# Patient Record
Sex: FEMALE | Employment: STUDENT | URBAN - METROPOLITAN AREA
[De-identification: names, ages, dates, MRNs, and addresses within clinical notes are randomized per-mention and may not be internally consistent; named-entity substitution may affect disease eponyms.]

---

## 2019-11-21 ENCOUNTER — OFFICE VISIT - HEALTHEAST (OUTPATIENT)
Dept: FAMILY MEDICINE | Facility: CLINIC | Age: 24
End: 2019-11-21

## 2019-11-21 DIAGNOSIS — Z00.00 ANNUAL PHYSICAL EXAM: ICD-10-CM

## 2019-11-21 DIAGNOSIS — Z79.899 CONTROLLED SUBSTANCE AGREEMENT SIGNED: ICD-10-CM

## 2019-11-21 DIAGNOSIS — Z23 IMMUNIZATION DUE: ICD-10-CM

## 2019-11-21 DIAGNOSIS — Z87.42 HISTORY OF ABNORMAL CERVICAL PAP SMEAR: ICD-10-CM

## 2019-11-21 DIAGNOSIS — F90.0 ATTENTION DEFICIT HYPERACTIVITY DISORDER (ADHD), PREDOMINANTLY INATTENTIVE TYPE: ICD-10-CM

## 2019-11-21 ASSESSMENT — MIFFLIN-ST. JEOR: SCORE: 1292.83

## 2019-11-23 ENCOUNTER — COMMUNICATION - HEALTHEAST (OUTPATIENT)
Dept: FAMILY MEDICINE | Facility: CLINIC | Age: 24
End: 2019-11-23

## 2019-11-25 ENCOUNTER — COMMUNICATION - HEALTHEAST (OUTPATIENT)
Dept: FAMILY MEDICINE | Facility: CLINIC | Age: 24
End: 2019-11-25

## 2019-11-25 DIAGNOSIS — F90.0 ATTENTION DEFICIT HYPERACTIVITY DISORDER (ADHD), PREDOMINANTLY INATTENTIVE TYPE: ICD-10-CM

## 2019-11-26 LAB
BKR LAB AP ABNORMAL BLEEDING: NO
BKR LAB AP BIRTH CONTROL/HORMONES: ABNORMAL
BKR LAB AP CERVICAL APPEARANCE: NORMAL
BKR LAB AP GYN ADEQUACY: ABNORMAL
BKR LAB AP GYN INTERPRETATION: ABNORMAL
BKR LAB AP GYN OTHER FINDINGS: ABNORMAL
BKR LAB AP HPV REFLEX: ABNORMAL
BKR LAB AP LMP: ABNORMAL
BKR LAB AP PATIENT STATUS: ABNORMAL
BKR LAB AP PREVIOUS ABNORMAL: ABNORMAL
BKR LAB AP PREVIOUS NORMAL: ABNORMAL
HIGH RISK?: NO
PATH REPORT.COMMENTS IMP SPEC: ABNORMAL
RESULT FLAG (HE HISTORICAL CONVERSION): ABNORMAL

## 2020-02-19 ENCOUNTER — COMMUNICATION - HEALTHEAST (OUTPATIENT)
Dept: FAMILY MEDICINE | Facility: CLINIC | Age: 25
End: 2020-02-19

## 2020-02-19 DIAGNOSIS — F90.0 ATTENTION DEFICIT HYPERACTIVITY DISORDER (ADHD), PREDOMINANTLY INATTENTIVE TYPE: ICD-10-CM

## 2020-02-24 ENCOUNTER — COMMUNICATION - HEALTHEAST (OUTPATIENT)
Dept: FAMILY MEDICINE | Facility: CLINIC | Age: 25
End: 2020-02-24

## 2020-02-24 DIAGNOSIS — F90.0 ATTENTION DEFICIT HYPERACTIVITY DISORDER (ADHD), PREDOMINANTLY INATTENTIVE TYPE: ICD-10-CM

## 2020-02-26 ENCOUNTER — OFFICE VISIT - HEALTHEAST (OUTPATIENT)
Dept: FAMILY MEDICINE | Facility: CLINIC | Age: 25
End: 2020-02-26

## 2020-02-26 DIAGNOSIS — F90.0 ATTENTION DEFICIT HYPERACTIVITY DISORDER (ADHD), PREDOMINANTLY INATTENTIVE TYPE: ICD-10-CM

## 2020-02-26 ASSESSMENT — MIFFLIN-ST. JEOR: SCORE: 1287.72

## 2020-03-01 ENCOUNTER — HEALTH MAINTENANCE LETTER (OUTPATIENT)
Age: 25
End: 2020-03-01

## 2020-03-24 ENCOUNTER — RECORDS - HEALTHEAST (OUTPATIENT)
Dept: ADMINISTRATIVE | Facility: OTHER | Age: 25
End: 2020-03-24

## 2020-10-08 ENCOUNTER — COMMUNICATION - HEALTHEAST (OUTPATIENT)
Dept: FAMILY MEDICINE | Facility: CLINIC | Age: 25
End: 2020-10-08

## 2020-10-13 ENCOUNTER — OFFICE VISIT - HEALTHEAST (OUTPATIENT)
Dept: FAMILY MEDICINE | Facility: CLINIC | Age: 25
End: 2020-10-13

## 2020-10-13 ENCOUNTER — COMMUNICATION - HEALTHEAST (OUTPATIENT)
Dept: FAMILY MEDICINE | Facility: CLINIC | Age: 25
End: 2020-10-13

## 2020-10-13 DIAGNOSIS — F90.0 ATTENTION DEFICIT HYPERACTIVITY DISORDER (ADHD), PREDOMINANTLY INATTENTIVE TYPE: ICD-10-CM

## 2020-10-13 DIAGNOSIS — Z01.84 ANTIBODY RESPONSE EXAMINATION: ICD-10-CM

## 2020-10-13 DIAGNOSIS — Z79.899 CONTROLLED SUBSTANCE AGREEMENT SIGNED: ICD-10-CM

## 2020-10-13 DIAGNOSIS — N76.0 ACUTE VAGINITIS: ICD-10-CM

## 2020-10-13 DIAGNOSIS — L92.1 NECROBIOSIS LIPOIDICA: ICD-10-CM

## 2020-10-13 DIAGNOSIS — Z23 IMMUNIZATION DUE: ICD-10-CM

## 2020-10-13 DIAGNOSIS — Z87.42 HISTORY OF ABNORMAL CERVICAL PAP SMEAR: ICD-10-CM

## 2020-10-13 DIAGNOSIS — N89.8 VAGINAL DISCHARGE: ICD-10-CM

## 2020-10-13 LAB
CLUE CELLS: ABNORMAL
TRICHOMONAS, WET PREP: ABNORMAL
YEAST, WET PREP: ABNORMAL

## 2020-10-13 ASSESSMENT — MIFFLIN-ST. JEOR: SCORE: 1274.12

## 2020-10-15 ENCOUNTER — COMMUNICATION - HEALTHEAST (OUTPATIENT)
Dept: FAMILY MEDICINE | Facility: CLINIC | Age: 25
End: 2020-10-15

## 2020-10-15 DIAGNOSIS — F90.0 ATTENTION DEFICIT HYPERACTIVITY DISORDER (ADHD), PREDOMINANTLY INATTENTIVE TYPE: ICD-10-CM

## 2020-10-15 LAB
BKR LAB AP ABNORMAL BLEEDING: NO
BKR LAB AP BIRTH CONTROL/HORMONES: ABNORMAL
BKR LAB AP CERVICAL APPEARANCE: ABNORMAL
BKR LAB AP GYN ADEQUACY: ABNORMAL
BKR LAB AP GYN INTERPRETATION: ABNORMAL
BKR LAB AP GYN OTHER FINDINGS: ABNORMAL
BKR LAB AP HPV REFLEX: ABNORMAL
BKR LAB AP LMP: ABNORMAL
BKR LAB AP PATIENT STATUS: ABNORMAL
BKR LAB AP PREVIOUS ABNORMAL: ABNORMAL
BKR LAB AP PREVIOUS NORMAL: ABNORMAL
HIGH RISK?: NO
PATH REPORT.COMMENTS IMP SPEC: ABNORMAL
RESULT FLAG (HE HISTORICAL CONVERSION): ABNORMAL

## 2020-10-16 ENCOUNTER — COMMUNICATION - HEALTHEAST (OUTPATIENT)
Dept: FAMILY MEDICINE | Facility: CLINIC | Age: 25
End: 2020-10-16

## 2020-10-16 DIAGNOSIS — Z87.42 HISTORY OF ABNORMAL CERVICAL PAP SMEAR: ICD-10-CM

## 2020-11-06 LAB — RABV NAB SER NT-ACNC: 1 IU/ML

## 2020-11-19 ENCOUNTER — COMMUNICATION - HEALTHEAST (OUTPATIENT)
Dept: FAMILY MEDICINE | Facility: CLINIC | Age: 25
End: 2020-11-19

## 2020-12-14 ENCOUNTER — COMMUNICATION - HEALTHEAST (OUTPATIENT)
Dept: OBGYN | Facility: CLINIC | Age: 25
End: 2020-12-14

## 2020-12-14 ENCOUNTER — HEALTH MAINTENANCE LETTER (OUTPATIENT)
Age: 25
End: 2020-12-14

## 2020-12-14 ENCOUNTER — COMMUNICATION - HEALTHEAST (OUTPATIENT)
Dept: LAB | Facility: CLINIC | Age: 25
End: 2020-12-14

## 2020-12-14 DIAGNOSIS — Z87.42 HISTORY OF ABNORMAL CERVICAL PAP SMEAR: ICD-10-CM

## 2021-03-30 ENCOUNTER — COMMUNICATION - HEALTHEAST (OUTPATIENT)
Dept: OBGYN | Facility: CLINIC | Age: 26
End: 2021-03-30

## 2021-03-30 DIAGNOSIS — Z87.42 HISTORY OF ABNORMAL CERVICAL PAP SMEAR: ICD-10-CM

## 2021-04-08 ENCOUNTER — COMMUNICATION - HEALTHEAST (OUTPATIENT)
Dept: FAMILY MEDICINE | Facility: CLINIC | Age: 26
End: 2021-04-08

## 2021-04-08 DIAGNOSIS — F90.0 ATTENTION DEFICIT HYPERACTIVITY DISORDER (ADHD), PREDOMINANTLY INATTENTIVE TYPE: ICD-10-CM

## 2021-04-12 ENCOUNTER — COMMUNICATION - HEALTHEAST (OUTPATIENT)
Dept: FAMILY MEDICINE | Facility: CLINIC | Age: 26
End: 2021-04-12

## 2021-04-12 DIAGNOSIS — B37.31 RECURRENT CANDIDIASIS OF VAGINA: ICD-10-CM

## 2021-04-17 ENCOUNTER — HEALTH MAINTENANCE LETTER (OUTPATIENT)
Age: 26
End: 2021-04-17

## 2021-04-29 ENCOUNTER — OFFICE VISIT - HEALTHEAST (OUTPATIENT)
Dept: FAMILY MEDICINE | Facility: CLINIC | Age: 26
End: 2021-04-29

## 2021-04-29 DIAGNOSIS — Z87.42 HISTORY OF ABNORMAL CERVICAL PAP SMEAR: ICD-10-CM

## 2021-04-29 DIAGNOSIS — B37.31 VAGINAL CANDIDIASIS: ICD-10-CM

## 2021-04-29 DIAGNOSIS — Z23 IMMUNIZATION DUE: ICD-10-CM

## 2021-06-03 VITALS
RESPIRATION RATE: 20 BRPM | HEART RATE: 68 BPM | WEIGHT: 125.38 LBS | BODY MASS INDEX: 22.21 KG/M2 | HEIGHT: 63 IN | TEMPERATURE: 98.4 F | SYSTOLIC BLOOD PRESSURE: 108 MMHG | DIASTOLIC BLOOD PRESSURE: 70 MMHG

## 2021-06-03 NOTE — PROGRESS NOTES
Pap smear last year LSIL. Per ASCCP guidelines, recommended repeat cytology in 12 months.    Testing again reveals LSIL.  Per ASCCP guidelines, next step is to repeat cytology again at 12 months.  If on follow-up Pap smear results are ASCUS or greater, recommend proceeding to colposcopy.  Patient updated by my chart message.  Ju Padilla, DO

## 2021-06-03 NOTE — PROGRESS NOTES
[facility] Note    Name: Grace E Kopitzke  : 1995   MRN: [id number]    Grace E Kopitzke is a 24 y.o. female presenting to discuss the following:     CC:   Chief Complaint   Patient presents with     [facility] Care     Annual Exam     Pap     HPI:  History of abnormal pap smear August of last year. Had IUD placed at that time. Was told to come in for biopsy, then told she did't need it. Normally sees yeast infection on pap, asymptomatic.     History of ADHD growing up, never was on medication but family discussed often. 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 Has a clinical psychologist at school and suggested coming into clinic.    No concerns for STI screens, no new partners since last check.   Will do flu shot today.    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    She is not exercising regularly at this time.  She has no vision concerns.  She does see the dentist regularly.    ROS:   See HPI above.     PMH:   Patient Active Problem List   Diagnosis     Attention deficit hyperactivity disorder (ADHD), predominantly inattentive type     Controlled substance agreement signed - 19     History of abnormal cervical Pap smear - LSIL      Kyleena IUD in place - remove 2021     Past Medical History:   Diagnosis Date     Abnormal Papanicolaou smear of cervix LSIL 2018       PSH:   History reviewed. No pertinent surgical history.    MEDICATIONS:   Current Outpatient Medications on File Prior to Visit   Medication Sig Dispense Refill     b complex vitamins tablet Take 1 tablet by mouth daily.       cholecalciferol, vitamin D3, 2,000 unit Tab Take 2,000 Units by mouth daily.       levonorgestrel (KYLEENA) 17.5 mcg/24 hrs (5 yrs) 19.5 mg IUD IUD 1 Intra Uterine Device by Intrauterine route.       naproxen sodium (ALEVE) 220 MG tablet Take 220 mg by mouth 2 (two) times a day as needed.       No current facility-administered medications  "on file prior to visit.      ALLERGIES:  Allergies   Allergen Reactions     Benadryl [Diphenhydramine Hcl]      Reports fatigue     Guaifenesin Other (See Comments)     \"feels like I'm high\"       FAMHx:  Family History   Problem Relation Age of Onset     No Medical Problems Mother      No Medical Problems Father      No Medical Problems Sister      No Medical Problems Brother        SOCIAL HISTORY:   Social History     Tobacco Use     Smoking status: Never Smoker     Smokeless tobacco: Never Used   Substance Use Topics     Alcohol use: Not Currently     Drug use: Never     PHYSICAL EXAM:   /70   Pulse 68   Temp 98.4  F (36.9  C) (Oral)   Resp 20   Ht 5' 3\" (1.6 m)   Wt 125 lb 6 oz (56.9 kg)   LMP 10/17/2019 (Approximate)   BMI 22.21 kg/m     GENERAL: Ida is a pleasant, well-appearing female in no acute distress.  HEENT: Sclera white, no nasal discharge, oropharynx pink and moist, no cervical lymphadenopathy, no thyromegaly.  HEART: Regular rate and rhythm, no murmurs.  LUNGS: Clear to auscultation bilaterally, unlabored.  ABDOMEN: Soft, nontender to palpation.  No palpable masses.  GYN: No external genital lesions.  Neurologic discharge.  No gross cervical lesions present.  IUD strings visible extruding through external loss.  MSK: No deformities or effusions.  NEURO: Speech intact, face symmetrical, no gross deficits.  Normal gait.  PSYCH: Mood is good, normal affect, appropriately groomed.    ASSESSMENT & PLAN:   Grace E Kopitzke is a 24 y.o. female presenting today for annual physical exam, to establish care, follow-up abnormal Pap smear, and discuss concerns for ADHD.    1. Annual physical exam  Reviewed past medical history.  Reviewed health maintenance recommendations.  He declines STI screening today, recently had this done.  She will check her home records for last tetanus booster.  If due, she will schedule nurse only appointment for tetanus booster.    2. Attention deficit hyperactivity " disorder (ADHD), predominantly inattentive type  3. Controlled substance agreement signed - 11/21/19  - dextroamphetamine-amphetamine (ADDERALL XR) 20 MG 24 hr capsule; Take 1 capsule (20 mg total) by mouth every morning.  Dispense: 30 capsule; Refill: 0    History of ADHD diagnosis child although never medically treated.  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  She did meet with school psychologist who recommended evaluation for ADHD.  Reviewed adult ADHD screening tool and she certainly fits characteristics of adult ADHD.  Discussed possibility of referral to [facility] for formal evaluation, however she is under time crunch with that urinary coursework. Given previous history, do think it is reasonable to initiate treatment for ADHD with Adderall.  Reviewed controlled substance agreement today with patient and she signed this.  We will start with Adderall 20 mg extended release daily.  She will send my chart message if dose is inadequate or if too many side effects.    Follow-up in clinic in 3 months for ADHD recheck    4. History of abnormal cervical Pap smear - LSIL 8/18  - Gynecologic Cytology (PAP Smear)    History of LSIL. By ASCCP algorithm, due for repeat pap now. Pending results, will either repeat cotesting in 1 year or proceed to colposcopy.     5. Immunization due  - Influenza,Seasonal,Quad,INJ =/>6months     HM: Declined STI screen today.     RTC: 3 months - follow up ADHD    Ju Padilla DO

## 2021-06-03 NOTE — TELEPHONE ENCOUNTER
Patient sent update on effectiveness of Adderall. Helpful, but wears off after 5 hours. Trialed two times a day dosing and more effective, still able to sleep.   Updated prescription to pharmacy.    1. Attention deficit hyperactivity disorder (ADHD), predominantly inattentive type  - dextroamphetamine-amphetamine (ADDERALL XR) 20 MG 24 hr capsule; Take 1 capsule (20 mg total) by mouth 2 times daily before breakfast and lunch.  Dispense: 60 capsule; Refill: 0     Ju Padilla DO

## 2021-06-04 VITALS
SYSTOLIC BLOOD PRESSURE: 104 MMHG | DIASTOLIC BLOOD PRESSURE: 70 MMHG | HEIGHT: 63 IN | BODY MASS INDEX: 22.02 KG/M2 | WEIGHT: 124.25 LBS | RESPIRATION RATE: 16 BRPM | HEART RATE: 68 BPM

## 2021-06-04 VITALS
HEIGHT: 63 IN | DIASTOLIC BLOOD PRESSURE: 76 MMHG | SYSTOLIC BLOOD PRESSURE: 113 MMHG | WEIGHT: 121.25 LBS | HEART RATE: 69 BPM | BODY MASS INDEX: 21.48 KG/M2

## 2021-06-05 VITALS
BODY MASS INDEX: 21.75 KG/M2 | HEART RATE: 65 BPM | TEMPERATURE: 98.1 F | WEIGHT: 122.8 LBS | SYSTOLIC BLOOD PRESSURE: 114 MMHG | RESPIRATION RATE: 16 BRPM | DIASTOLIC BLOOD PRESSURE: 75 MMHG

## 2021-06-06 NOTE — TELEPHONE ENCOUNTER
Reviewed [place] . Refill request is appropriate.  Patient due for follow up. If stable, will plan to extend ADHD checks to every 6 months.  Ju Padilla, DO

## 2021-06-06 NOTE — TELEPHONE ENCOUNTER
Controlled Substance Refill Request  Medication Name:   Requested Prescriptions     Pending Prescriptions Disp Refills     dextroamphetamine-amphetamine (ADDERALL XR) 20 MG 24 hr capsule 60 capsule 0     Sig: Take 1 capsule (20 mg total) by mouth 2 times daily before breakfast and lunch.     Date Last Fill: 12/4/19  Requested Pharmacy: [facility]  Submit electronically to pharmacy  Controlled Substance Agreement on file:   Encounter-Level CSA Scan Date:    There are no encounter-level csa scan date.        Last office visit:  11/21/19 physical

## 2021-06-06 NOTE — PROGRESS NOTES
"[facility] Note    Name: Grace E Kopitzke  : 1995   MRN: [id number]    Grace E Kopitzke is a 24 y.o. female resenting to discuss the following:     CC:   Chief Complaint   Patient presents with     Medication Education Visit       HPI:  Ida presents today for follow up ADHD medications since restarting last fall. Medications have helped a lot. She is better able to focus during exams. She uses twice daily some days, most days only needs 1 dose. Denies any side effects. Appetite is good. Sleeping okay.     ROS:   See HPI above.     PMH:   Patient Active Problem List   Diagnosis     Attention deficit hyperactivity disorder (ADHD), predominantly inattentive type     Controlled substance agreement signed - 19     History of abnormal cervical Pap smear - LSIL  and      Kyleena IUD in place - remove 2021       Past Medical History:   Diagnosis Date     Abnormal Papanicolaou smear of cervix LSIL 2018       PSH:   No past surgical history on file.      MEDICATIONS:   Current Outpatient Medications on File Prior to Visit   Medication Sig Dispense Refill     b complex vitamins tablet Take 1 tablet by mouth daily.       cholecalciferol, vitamin D3, 2,000 unit Tab Take 2,000 Units by mouth daily.       dextroamphetamine-amphetamine (ADDERALL XR) 20 MG 24 hr capsule Take 1 capsule (20 mg total) by mouth 2 times daily before breakfast and lunch. 60 capsule 0     fish oil-omega-3 fatty acids 300-1,000 mg capsule        levonorgestrel (KYLEENA) 17.5 mcg/24 hrs (5 yrs) 19.5 mg IUD IUD 1 Intra Uterine Device by Intrauterine route.       naproxen sodium (ALEVE) 220 MG tablet Take 220 mg by mouth 2 (two) times a day as needed.       No current facility-administered medications on file prior to visit.        ALLERGIES:  Allergies   Allergen Reactions     Benadryl [Diphenhydramine Hcl]      Reports fatigue     Guaifenesin Other (See Comments)     \"feels like I'm high\"         PHYSICAL EXAM:   BP " "104/70   Pulse 68   Resp 16   Ht 5' 3\" (1.6 m)   Wt 124 lb 4 oz (56.4 kg)   BMI 22.01 kg/m     GENERAL: Ida is a pleasant, well appearing female, in no acute distress.   HEART: Regular rate and rhythm, no murmurs.   LUNGS: Clear to auscultation bilaterally, unlabored.   ABDOMEN: Soft, non-tender to palpation.     ASSESSMENT & PLAN:   Grace E Kopitzke is a 24 y.o. female presenting today for ADHD follow up.     1. Attention deficit hyperactivity disorder (ADHD), predominantly inattentive type  - dextroamphetamine-amphetamine (ADDERALL XR) 20 MG 24 hr capsule; Take 1 capsule (20 mg total) by mouth 2 times daily before breakfast and lunch.  Dispense: 60 capsule; Refill: 0     ADHD is well controlled with current management. Will refill current prescription.  Follow up in 6 months for ADHD recheck.    RTC: 6 months - ADHD follow up    Ju Padilla,        "

## 2021-06-06 NOTE — TELEPHONE ENCOUNTER
Controlled Substance Refill Request  Medication:   Requested Prescriptions     Pending Prescriptions Disp Refills     dextroamphetamine-amphetamine (ADDERALL XR) 20 MG 24 hr capsule 60 capsule 0     Sig: Take 1 capsule (20 mg total) by mouth 2 times daily before breakfast and lunch.       Date Last Fill: 11/25/19    Pharmacy: [facility]        Submit electronically to pharmacy      Controlled Substance Agreement on File:   Encounter-Level CSA Scan Date:    There are no encounter-level csa scan date.           Last office visit: Visit date not found

## 2021-06-12 NOTE — TELEPHONE ENCOUNTER
Insurance will not cover her long acting stimulant twice daily.  We will trial once daily.  Ida will pay attention to what time the medication wears off.  We can consider alternatives of the short acting twice daily versus attempting a prior authorization for long-acting twice daily as she appears to be a fast metabolizer.  Ju Padilla, DO

## 2021-06-12 NOTE — TELEPHONE ENCOUNTER
I sent a Qlika message to Ida to see how she like to proceed.  We could prescribe once daily dosing and skip the afternoon dose.  I could try a higher dose of the once daily dosing although do not recommend this if the lower dose is effective and is just wearing off.  We could try long-acting with a short acting at noon, although unclear what the cost would be for 2 prescriptions.  Lastly, we could try transitioning to short acting, but I have hesitations about this as the long-acting is wearing off too soon.  Awaiting patient response.  Ju Padilla, DO

## 2021-06-12 NOTE — PROGRESS NOTES
Wet prep confirms yeast infection.  Clue cells also present, but symptoms more consistent with yeast. If still having symptoms after treatment for yeast, we can add on treatment for BV. Patient updated by MyChart.  Ju Padilla, DO

## 2021-06-12 NOTE — TELEPHONE ENCOUNTER
Prior Authorization Request  Who s requesting:  Pharmacy  Pharmacy Name and Location: [facility] #[id number]  Medication Name: dextroamphetamine-amphetamine (ADDERALL XR) 20 MG 24 hr capsule  Insurance Plan: No info under the [employer] Benefits tab.   Insurance Member ID Number:  Per fax, [id number]  CoverMyMeds Key: N/A  Informed patient that prior authorizations can take up to 10 business days for response:   NA  Okay to leave a detailed message: No

## 2021-06-12 NOTE — PROGRESS NOTES
[facility] Note    Name: Grace E Kopitzke  : 1995   MRN: [id number]    Grace E Kopitzke is a 25 y.o. female presenting to discuss the following:     CC:   Chief Complaint   Patient presents with     Gynecologic Exam     Repeat pap     Injections       HPI:  Ida presents today for follow up pap smear. Abnormal pap smear 1 year ago.     Also notes increased vaginal discharge and itching.     She would like injection of skin lesions in legs. Derm has previously done steroid injections.     ADHD is well controlled. She is intermittently taking her meds.     Needs antibody testing for prior rabies vaccine.    ROS:   See HPI above.     PMH:   Patient Active Problem List   Diagnosis     Attention deficit hyperactivity disorder (ADHD), predominantly inattentive type     Controlled substance agreement signed - 19     History of abnormal cervical Pap smear - LSIL  and      Kyleena IUD in place - remove 2021       Past Medical History:   Diagnosis Date     Abnormal Papanicolaou smear of cervix LSIL 2018       PSH:   No past surgical history on file.      MEDICATIONS:   Current Outpatient Medications on File Prior to Visit   Medication Sig Dispense Refill     levonorgestrel (KYLEENA) 17.5 mcg/24 hrs (5 yrs) 19.5 mg IUD IUD 1 Intra Uterine Device by Intrauterine route.       b complex vitamins tablet Take 1 tablet by mouth daily.       cholecalciferol, vitamin D3, 2,000 unit Tab Take 2,000 Units by mouth daily.       dextroamphetamine-amphetamine (ADDERALL XR) 20 MG 24 hr capsule Take 1 capsule (20 mg total) by mouth 2 times daily before breakfast and lunch. 60 capsule 0     fish oil-omega-3 fatty acids 300-1,000 mg capsule        naproxen sodium (ALEVE) 220 MG tablet Take 220 mg by mouth 2 (two) times a day as needed.       No current facility-administered medications on file prior to visit.        ALLERGIES:  Allergies   Allergen Reactions     Benadryl [Diphenhydramine Hcl]      Reports  "fatigue     Guaifenesin Other (See Comments)     \"feels like I'm high\"         FAMHx:  Family History   Problem Relation Age of Onset     No Medical Problems Mother      No Medical Problems Father      No Medical Problems Sister      No Medical Problems Brother        SOCIAL HISTORY:   Social History     Tobacco Use     Smoking status: Never Smoker     Smokeless tobacco: Never Used   Substance Use Topics     Alcohol use: Not Currently     Drug use: Never         PHYSICAL EXAM:   /76   Pulse 69   Ht 5' 3\" (1.6 m)   Wt 121 lb 4 oz (55 kg)   LMP  (LMP Unknown)   BMI 21.48 kg/m     GENERAL: Ida is a pleasant, non-toxic appearing female, in no acute distress.   HEENT: Sclera white, no cervical lymphadenopathy, no thyromegaly.   HEART: Regular rate and rhythm, no murmurs.   LUNGS: Clear to auscultation bilaterally, unlabored.   ABDOMEN: Soft, non-tender to palpation. No palpable masses.   GYN: White, clumpy discharge, no odor. Cervix without visible lesions. IUD strings visible.   MSK: No gross deformities or effusions.   PSYCH: Mood is good, normal affect, appropriately groomed.   DERM: There are several hyperpigmented macules on bilateral legs, non-tender to palpation, non-blanching.    LABS:   Recent Results (from the past 24 hour(s))   Wet Prep, Vaginal    Specimen: Genital   Result Value Ref Range    Yeast Result Yeast Seen (!) No yeast seen    Trichomonas No Trichomonas seen No Trichomonas seen    Clue Cells, Wet Prep Clue cells seen (!) No Clue cells seen        ASSESSMENT & PLAN:   Grace E Kopitzke is a 25 y.o. female presenting today for several concerns per below.    1. History of abnormal cervical Pap smear - LSIL 8/18 and 11/19  - Gynecologic Cytology (PAP Smear)    Reviewed ASCCP guidelines with Ida today. If pap shows anything but normal, will need colposcopy. Reviewed change in procedure, pap team will reach out to the patient with results and next steps. We can do colposcopy in clinic. "     2. Necrobiosis lipoidica  - triamcinolone acetonide 40 mg/mL injection 40 mg (KENALOG-40)    Ida requests steroid injections into lesions as previously done with dermatology. Reviewed procedural consent form and obtained written consent to proceed with injections.    PROCEDURE: Cleansed overlying skin with alcohol swab. Utilized 40mg (1ml) of kenalog diluted with 1ml lidocaine without epinephrine and injected equally into each lesion, approximately 0.4ml per lesion. Patient tolerated procedure well.    3. Acute vaginitis  4. Vaginal discharge  - Wet Prep, Vaginal  - fluconazole (DIFLUCAN) 150 MG tablet; Take 1 tablet (150 mg total) by mouth once for 1 dose.  Dispense: 1 tablet; Refill: 0    Symptoms and exam consistent with yeast infection. Will treat with Diflucan. Wet prep confirms presence of yeast.  Clue cells also present. Not able to obtain Amsel criteria as we do not have pH testing capabilities. If she still has symptoms after treatment of yeast, can cover for BV. She can send [employer] message.     5. Antibody response examination  - Rabies Antibody Endpoint    Will send letter with results to [employer] for school.     6. Attention deficit hyperactivity disorder (ADHD), predominantly inattentive type  7. Controlled substance agreement signed - 10/13/20  - dextroamphetamine-amphetamine (ADDERALL XR) 20 MG 24 hr capsule; Take 1 capsule (20 mg total) by mouth 2 times daily before breakfast and lunch.  Dispense: 60 capsule; Refill: 0    Reviewed and signed new non-opioid controlled substance agreement.    8. Immunization due  - Td, Preservative Free (green label)  - HPV vaccine 9 valent 3 dose IM     HM: up to date    RTC: 6 months - med check / sooner as needed     Ju Padilla,

## 2021-06-12 NOTE — TELEPHONE ENCOUNTER
Central PA team  [phone]  Pool: [employer] ([id number])          PA has been initiated.       Calling [employer] for form. Once received, will fill out and fax back to insurance for review.      Response will be received via fax and may take up to 5-10 business days depending on plan

## 2021-06-13 NOTE — TELEPHONE ENCOUNTER
Checked on status of request, still in review.  PA team did request this be urgent as patient is leaving town tomorrow.  PA could not be done over the phone as additional criteria questions need to be faxed by clinical review team from [employer].  EPA is not available.    Will keep checking for updates.

## 2021-06-13 NOTE — TELEPHONE ENCOUNTER
Prior Authorization Request  Who s requesting:  Patient  Pharmacy Name and Location: [facility] #[id number]   Medication Name:    Disp Refills Start End    dextroamphetamine-amphetamine (ADDERALL XR) 20 MG 24 hr capsule 30 capsule 0 10/16/2020     Sig - Route: Take 1 capsule (20 mg total) by mouth daily. - Oral    Sent to pharmacy as: dextroamphetamine-amphetamine ER 20 mg 24hr capsule,extend release (ADDERALL XR)    Earliest Fill Date: 10/16/2020    E-Prescribing Status: Receipt confirmed by pharmacy (10/16/2020  4:35 PM CDT)      Insurance Plan: [employer]  Insurance Member ID Number:  [id number]  CoverMyMeds Key: N/A  Informed patient that prior authorizations can take up to 10 business days for response:   No  Okay to leave a detailed message: Yes  [phone]    FYI: Patient stated that she is out of her medication and would like this to be addressed as soon as possible. Patient stated she will be leaving out of state tomorrow.

## 2021-06-13 NOTE — TELEPHONE ENCOUNTER
Central PA team  [phone]  Pool: [facility] [employer] ([id number])          PA has been initiated.       Called [employer], PA cannot be done over EPA.  Requested forms be faxed ASAP.  Marked for urgent review.         Response will be received via fax and may take up to 5-10 business days depending on plan

## 2021-06-16 PROBLEM — Z79.899 CONTROLLED SUBSTANCE AGREEMENT SIGNED: Status: ACTIVE | Noted: 2019-11-21

## 2021-06-16 PROBLEM — F90.0 ATTENTION DEFICIT HYPERACTIVITY DISORDER (ADHD), PREDOMINANTLY INATTENTIVE TYPE: Status: ACTIVE | Noted: 2019-11-21

## 2021-06-16 PROBLEM — Z87.42 HISTORY OF ABNORMAL CERVICAL PAP SMEAR: Status: ACTIVE | Noted: 2019-11-21

## 2021-06-16 PROBLEM — Z97.5 IUD (INTRAUTERINE DEVICE) IN PLACE: Status: ACTIVE | Noted: 2019-11-21

## 2021-06-16 NOTE — TELEPHONE ENCOUNTER
Former patient of ??? & has not established care with another provider.  Please assign refill request to covering provider per Clinic standard process.    Controlled Substance Refill Request  Medication Name:   Requested Prescriptions     Pending Prescriptions Disp Refills     dextroamphetamine-amphetamine (ADDERALL XR) 20 MG 24 hr capsule 30 capsule 0     Sig: Take 1 capsule (20 mg total) by mouth daily.     Date Last Fill: 10/16/20  [facility]  Submit electronically to pharmacy  Controlled Substance Agreement on file:   Encounter-Level CSA Scan Date:    There are no encounter-level csa scan date.        Last office visit:  10/13/20

## 2021-06-16 NOTE — TELEPHONE ENCOUNTER
Ida last filled meds 11/18/20 and no refills since.  Has she been out for a while? Indication for irregular use and why it is needed now? Can she wait until appointment scheduled 4/27?  Ju Padilla, DO

## 2021-06-17 NOTE — TELEPHONE ENCOUNTER
Telephone Encounter by Amy Paz at 10/15/2020  3:40 PM     Author: Amy Paz Service: -- Author Type: --    Filed: 10/15/2020  3:43 PM Encounter Date: 10/13/2020 Status: Signed    : Amy Paz       PRIOR AUTHORIZATION DENIED    Denial Rational: Insurance allows maximum of 1 capsule per day of the extended release Amphetamine-dextroamphetamine.            Appeal Information: Per call to [employer] max dosing of this drug is below.  Benefit exclusion of more than once daily dosing of extended release version.  Patient is allowed once daily dose of extended release and and immediate release product (this will need a prior authorization)

## 2021-06-17 NOTE — TELEPHONE ENCOUNTER
Telephone Encounter by Amy Paz at 11/19/2020  2:57 PM     Author: Amy Paz Service: -- Author Type: --    Filed: 11/19/2020  2:58 PM Encounter Date: 11/19/2020 Status: Signed    : Amy Paz APPROVED:    Approval start date: 11/19/2020  Approval end date:  ONGOING    Pharmacy has been notified of approval and will contact patient when medication is ready for pickup.

## 2021-06-17 NOTE — PROGRESS NOTES
Name: Grace E Kopitzke  : 1995   MRN: [id number]    ASSESSMENT & PLAN:   Grace E Kopitzke is a 25 y.o. female presenting today due to history of abnormal pap smears.     1. History of abnormal cervical Pap smear - LSIL , , 10/20  Reviewed importance of proceeding with colposcopy given serial L CHRISTINE Pap smears.  Reviewed the ASC CP guidelines with the patient.  Discussed treatment recommendations pending results.  Discussed risks and benefits of colposcopy.  She should schedule her colposcopy within the next few weeks, at her convenience.    2. Vaginal candidiasis  This is been an incidental finding on Pap smears, we have opted to treat her.  She continues to have yeast identified on Pap smears.  Due to mild symptoms, we have treated this, and she is now on suppressive therapy, weekly maintenance Diflucan for a period of time.  We will consider wet prep during her colposcopy to further evaluate for persistent yeast.    3. Immunization due  - HPV vaccine 9 valent 3 dose IM     Return in about 2 weeks (around 2021) for schedule 40 minute appointment for colposcopy.    Ju Padilla, [facility]        Grace E Kopitzke is a 25 y.o. female  presenting to discuss the following:     CC:   Chief Complaint   Patient presents with     Consult     Colposcopy     Immunizations     2nd Gardasil       HPI:  Ida presents today for follow-up abnormal Pap smears.  This is her third Pap revealing L CHRISTINE.  She was recommended to present for colposcopy.  Unfortunately, she was scheduled in a 20-minute appointment slot instead of a 40-minute procedure slot today.  We opted to proceed with a consult to review colposcopy and we will get her scheduled for the colposcopic exam.    She also has concerns given chronic yeast.  This is always incidentally found on her Pap smears.  Despite treatment with Diflucan, she has had persistent yeast present.  She currently is on a suppressive  "treatment with weekly Diflucan.  This is always asymptomatic, no itching, mildly clumpy discharge but not bothersome to patient.  Is at baseline.    She states she had her first HPV shot elsewhere, second was done with us 6 months ago, following up today to complete her HPV series.    ROS:   See HPI above.     PMH:   Patient Active Problem List   Diagnosis     Attention deficit hyperactivity disorder (ADHD), predominantly inattentive type     Controlled substance agreement signed - 10/13/20     History of abnormal cervical Pap smear - LSIL 8/18 and 11/19     Kyleena IUD in place - remove 8/2021       Past Medical History:   Diagnosis Date     Abnormal Papanicolaou smear of cervix LSIL 08/2018       PSH:   No past surgical history on file.      MEDICATIONS:   Current Outpatient Medications on File Prior to Visit   Medication Sig Dispense Refill     b complex vitamins tablet Take 1 tablet by mouth daily.       cholecalciferol, vitamin D3, 2,000 unit Tab Take 2,000 Units by mouth daily.       dextroamphetamine-amphetamine (ADDERALL XR) 20 MG 24 hr capsule Take 1 capsule (20 mg total) by mouth daily. 30 capsule 0     fish oil-omega-3 fatty acids 300-1,000 mg capsule        fluconazole (DIFLUCAN) 150 MG tablet 150 mg every 72 hours for three doses, followed by maintenance fluconazole therapy once per week for six months 30 tablet 0     levonorgestrel (KYLEENA) 17.5 mcg/24 hrs (5 yrs) 19.5 mg IUD IUD 1 Intra Uterine Device by Intrauterine route.       naproxen sodium (ALEVE) 220 MG tablet Take 220 mg by mouth 2 (two) times a day as needed.       No current facility-administered medications on file prior to visit.        ALLERGIES:  Allergies   Allergen Reactions     Benadryl [Diphenhydramine Hcl]      Reports fatigue     Guaifenesin Other (See Comments)     \"feels like I'm high\"         FAMHx:  Family History   Problem Relation Age of Onset     No Medical Problems Mother      No Medical Problems Father      No Medical " Problems Sister      No Medical Problems Brother        SOCIAL HISTORY:   Social History     Tobacco Use     Smoking status: Never Smoker     Smokeless tobacco: Never Used   Substance Use Topics     Alcohol use: Not Currently     Drug use: Never         PHYSICAL EXAM:   /75 (Patient Site: Left Arm, Patient Position: Sitting, Cuff Size: Adult Regular)   Pulse 65   Temp 98.1  F (36.7  C) (Oral)   Resp 16   Wt 122 lb 12.8 oz (55.7 kg)   BMI 21.75 kg/m     GENERAL: Ida is a pleasant, well appearing female, in no acute distress.   HEART: Regular rate and rhythm, no murmurs.   LUNGS: Clear to auscultation bilaterally, unlabored.

## 2021-06-19 NOTE — LETTER
Letter by Ju Padilla DO at      Author: Ju Padilla DO Service: -- Author Type: --    Filed:  Encounter Date: 11/21/2019 Status: Signed         [facility] MEDICINE AND OBSTETRICS  11/21/19    Patient: Grace E Kopitzke  YOB: 1995  Medical Record Number: [id number]  CSN: [id number]                                                                              Non-opioid Controlled Substance Agreement    I understand that my care provider has prescribed a controlled substance to help manage my condition(s). I am taking this medicine to help me function or work. I know this is strong medicine, and that it can cause serious side effects. Controlled substances can be sedating, addicting and may cause a dependency on the drug. They can affect my ability to drive or think, and cause depression. They need to be taken exactly as prescribed. Combining controlled substances with certain medicines or chemicals (such as cocaine, sedatives and tranquilizers, sleeping pills, meth) can be dangerous or even fatal. Also, if I stop controlled substances suddenly, I may have severe withdrawal symptoms.  If not helpful, I may be asked to stop them.    The risks, benefits, and side effects of these medicine(s) were explained to me. I agree that:    1. I will take part in other treatments as advised by my care team. This may be psychiatry or counseling, physical therapy, behavioral therapy, group treatment or a referral to a pain clinic. I will reduce or stop my medicine when my care team tells me to do so.  2. I will take my medicines as prescribed. I will not change the dose or schedule unless my care team tells me to. There will be no refills if I run out early.  I may be contactedwithout warning and asked to complete a urine drug test or pill count at any time.   3. I will keep all my appointments, and understand this is part of the monitoring of controlled substances. My care team may require an office visit for  EVERY controlled substance refill. If I miss appointments or dont follow instructions, my care team may stop my medicine.  4. I will not ask other providers to prescribe controlled substances, and I will not accept controlled substances from other people. If I need another prescribed controlled substance for a new reason, I will tell my care team within 1 business day.  5. I will use one pharmacy to fill all of my controlled substance prescriptions, and it is up to me to make sure that I do not run out of my medicines on weekends or holidays. If my care team is willing to refill my controlled substance prescription without a visit, I must request refills only during office hours, refills may take up to 3 days to process, and it may take up to 5 to 7 days for my medicine to be mailed and ready at my pharmacy. Prescriptions will not be mailed anywhere except my pharmacy.    6. I am responsible for my prescriptions. If the medicine/prescription is lost or stolen, it will not be replaced. I also agree not to share controlled substance medicines with anyone.          [facility] MEDICINE AND OBSTETRICS  11/21/19  Patient:  Grace E Kopitzke  YOB: 1995  Medical Record Number: [id number]  CSN: [id number]    7. I agree to not use ANY illegal or recreational drugs. This includes marijuana, cocaine, bath salts or other drugs. I agree not to use alcohol unless my care team says I may. I agree to give urine samples whenever asked. If I dont give a urine sample, the care team may stop my medicine.    8. If I enroll in the [place] Medical Marijuana program, I will tell my care team. I will also sign an agreement to share my medical records with my care team.    9. I will bring in my list of medicines (or my medicine bottles) each time I come to the clinic.   10. I will tell my care team right away if I become pregnant or have a new medical problem treated outside of my regular clinic.  11. I understand that this  medicine can affect my thinking and judgment. It may be unsafe for me to drive, use machinery and do dangerous tasks. I will not do any of these things until I know how the medicine affects me. If my dose changes, I will wait to see how it affects me. I will contact my care team if I have concerns about medicine side effects.    I understand that if I do not follow any of the conditions above, my prescriptions or treatment may be stopped.      I agree that my provider, clinic care team, and pharmacy may work with any city, state or federal law enforcement agency that investigates the misuse, sale, or other diversion of my controlled medicine. I will allow my provider to discuss my care with or share a copy of this agreement with any other treating provider, pharmacy or emergency room where I receive care. I agree to give up (waive) any right of privacy or confidentiality with respect to these consents.   I have read this agreement and have asked questions about anything I did not understand.    ___________________________________________________________________________  Patient signature - Date/Time  -Grace E Kopitzke                                      ___________________________________________________________________________  Witness signature                                                                    ___________________________________________________________________________  Provider signature- Ju Padilla, DO

## 2021-06-20 NOTE — LETTER
Letter by Ju Padilla DO at      Author: Ju Padilla DO Service: -- Author Type: --    Filed:  Encounter Date: 10/13/2020 Status: (Other)         [facility]  10/13/20    Patient: Grace E Kopitzke  YOB: 1995  Medical Record Number: [id number]  CSN: [id number]                                                                              Non-opioid Controlled Substance Agreement    I understand that my care provider has prescribed a controlled substance to help manage my condition(s). I am taking this medicine to help me function or work. I know this is strong medicine, and that it can cause serious side effects. Controlled substances can be sedating, addicting and may cause a dependency on the drug. They can affect my ability to drive or think, and cause depression. They need to be taken exactly as prescribed. Combining controlled substances with certain medicines or chemicals (such as cocaine, sedatives and tranquilizers, sleeping pills, meth) can be dangerous or even fatal. Also, if I stop controlled substances suddenly, I may have severe withdrawal symptoms.  If not helpful, I may be asked to stop them.    The risks, benefits, and side effects of these medicine(s) were explained to me. I agree that:    1. I will take part in other treatments as advised by my care team. This may be psychiatry or counseling, physical therapy, behavioral therapy, group treatment or a referral to a pain clinic. I will reduce or stop my medicine when my care team tells me to do so.  2. I will take my medicines as prescribed. I will not change the dose or schedule unless my care team tells me to. There will be no refills if I run out early.  I may be contactedwithout warning and asked to complete a urine drug test or pill count at any time.   3. I will keep all my appointments, and understand this is part of the monitoring of controlled substances. My care team may require an office visit  for EVERY controlled substance refill. If I miss appointments or dont follow instructions, my care team may stop my medicine.  4. I will not ask other providers to prescribe controlled substances, and I will not accept controlled substances from other people. If I need another prescribed controlled substance for a new reason, I will tell my care team within 1 business day.  5. I will use one pharmacy to fill all of my controlled substance prescriptions, and it is up to me to make sure that I do not run out of my medicines on weekends or holidays. If my care team is willing to refill my controlled substance prescription without a visit, I must request refills only during office hours, refills may take up to 3 days to process, and it may take up to 5 to 7 days for my medicine to be mailed and ready at my pharmacy. Prescriptions will not be mailed anywhere except my pharmacy.    6. I am responsible for my prescriptions. If the medicine/prescription is lost or stolen, it will not be replaced. I also agree not to share controlled substance medicines with anyone.          [facility]  10/13/20  Patient:  Grace E Kopitzke  YOB: 1995  Medical Record Number: [id number]  CSN: [id number]    7. I agree to not use ANY illegal or recreational drugs. This includes marijuana, cocaine, bath salts or other drugs. I agree not to use alcohol unless my care team says I may. I agree to give urine samples whenever asked. If I dont give a urine sample, the care team may stop my medicine.    8. If I enroll in the [place] Medical Marijuana program, I will tell my care team. I will also sign an agreement to share my medical records with my care team.    9. I will bring in my list of medicines (or my medicine bottles) each time I come to the clinic.   10. I will tell my care team right away if I become pregnant or have a new medical problem treated outside of my regular clinic.  11. I understand that  this medicine can affect my thinking and judgment. It may be unsafe for me to drive, use machinery and do dangerous tasks. I will not do any of these things until I know how the medicine affects me. If my dose changes, I will wait to see how it affects me. I will contact my care team if I have concerns about medicine side effects.    I understand that if I do not follow any of the conditions above, my prescriptions or treatment may be stopped.      I agree that my provider, clinic care team, and pharmacy may work with any city, state or federal law enforcement agency that investigates the misuse, sale, or other diversion of my controlled medicine. I will allow my provider to discuss my care with or share a copy of this agreement with any other treating provider, pharmacy or emergency room where I receive care. I agree to give up (waive) any right of privacy or confidentiality with respect to these consents.   I have read this agreement and have asked questions about anything I did not understand.    ___________________________________________________________________________  Patient signature - Date/Time  -Grace E Kopitzke                                      ___________________________________________________________________________  Witness signature                                                                    ___________________________________________________________________________  Provider signature- Ju Padilla, DO

## 2021-06-26 NOTE — PROGRESS NOTES
FYI to provider - Patient is lost to pap tracking follow-up. Attempts to contact pt have been made per reminder process and there has been no reply and/or no appt scheduled.       10/13/20 LSIL. Plan: colposcopy, due before 1/13/21  10/16/20 pt notified  12/14/20 Reminder mychart -- read  1/13/21 Vaughn not done. Tracking updated for 6 mo colp/pap due 4/13/21   3/30/21 Reminder mychart  4/29/21 Appt -- had colp consult. Is to schedule colp in the coming weeks  5/6/21 Reminder call -- left message   6/7/21 Lost to follow-up for pap tracking     Richa Parry RN BSN, Pap Tracking

## 2021-07-22 ENCOUNTER — OFFICE VISIT (OUTPATIENT)
Dept: FAMILY MEDICINE | Facility: CLINIC | Age: 26
End: 2021-07-22
Payer: COMMERCIAL

## 2021-07-22 VITALS
HEART RATE: 66 BPM | WEIGHT: 127.25 LBS | DIASTOLIC BLOOD PRESSURE: 67 MMHG | BODY MASS INDEX: 22.55 KG/M2 | HEIGHT: 63 IN | SYSTOLIC BLOOD PRESSURE: 109 MMHG

## 2021-07-22 DIAGNOSIS — Z87.42 HISTORY OF ABNORMAL CERVICAL PAP SMEAR: Primary | ICD-10-CM

## 2021-07-22 LAB — HCG UR QL: NEGATIVE

## 2021-07-22 PROCEDURE — 57456 ENDOCERV CURETTAGE W/SCOPE: CPT | Performed by: FAMILY MEDICINE

## 2021-07-22 PROCEDURE — 81025 URINE PREGNANCY TEST: CPT | Performed by: FAMILY MEDICINE

## 2021-07-22 RX ORDER — DEXTROAMPHETAMINE SACCHARATE, AMPHETAMINE ASPARTATE MONOHYDRATE, DEXTROAMPHETAMINE SULFATE AND AMPHETAMINE SULFATE 5; 5; 5; 5 MG/1; MG/1; MG/1; MG/1
CAPSULE, EXTENDED RELEASE ORAL
COMMUNITY
Start: 2020-11-18 | End: 2021-11-02

## 2021-07-22 RX ORDER — NAPROXEN SODIUM 220 MG
220 TABLET ORAL
COMMUNITY

## 2021-07-22 ASSESSMENT — MIFFLIN-ST. JEOR: SCORE: 1296.33

## 2021-07-22 NOTE — PROGRESS NOTES
Colposcopy Procedure Note     Grace E Kopitzke is a 26 year old female is here today for a Colposcopy.     Indications: Pap smear 9 months ago showed: LSIL. The prior pap dated 11/21/19 also showed LSIL. Prior cervical treatment: None     Procedure Details   The reason for procedure is explained and informed consent obtained.     Speculum placed in vagina and visualization of cervix achieved, cervix swabbed with 3% acetic acid solution. Cervix is also swabbed with Lugol's solution. Procedure detailsSCJ visualized 360 degrees without lesions and endocervical curettage performed     Findings:  Cervix: no visible lesions, no mosaicism, no punctation and no abnormal vasculature;         Specimens: See orders     Complications: none.     Plan:  Specimens labelled and sent to Pathology. Role of HPV in causing cervical pap smear abnormalities, dysplasia, and cancer is reviewed with the patient. She will be contacted in a few days with biopsy results and recommendations.     1. History of abnormal cervical Pap smear - LSIL 8/18 and 11/19  - HCG qualitative urine  - Colposcopy, with curettage  - Surgical pathology exam     Will reach out to patient with ECC results and follow up recommendations.     Ju Padilla, DO

## 2021-07-26 LAB
PATH REPORT.COMMENTS IMP SPEC: NORMAL
PATH REPORT.FINAL DX SPEC: NORMAL
PATH REPORT.GROSS SPEC: NORMAL
PATH REPORT.RELEVANT HX SPEC: NORMAL
PHOTO IMAGE: NORMAL

## 2021-07-26 PROCEDURE — 88305 TISSUE EXAM BY PATHOLOGIST: CPT | Performed by: PATHOLOGY

## 2021-10-02 ENCOUNTER — HEALTH MAINTENANCE LETTER (OUTPATIENT)
Age: 26
End: 2021-10-02

## 2021-12-14 ENCOUNTER — OFFICE VISIT (OUTPATIENT)
Dept: FAMILY MEDICINE | Facility: CLINIC | Age: 26
End: 2021-12-14
Payer: COMMERCIAL

## 2021-12-14 ENCOUNTER — TELEPHONE (OUTPATIENT)
Dept: FAMILY MEDICINE | Facility: CLINIC | Age: 26
End: 2021-12-14

## 2021-12-14 VITALS
HEART RATE: 79 BPM | SYSTOLIC BLOOD PRESSURE: 114 MMHG | HEIGHT: 63 IN | WEIGHT: 128 LBS | BODY MASS INDEX: 22.68 KG/M2 | DIASTOLIC BLOOD PRESSURE: 73 MMHG

## 2021-12-14 DIAGNOSIS — Z79.899 CONTROLLED SUBSTANCE AGREEMENT SIGNED: ICD-10-CM

## 2021-12-14 DIAGNOSIS — F90.0 ATTENTION DEFICIT HYPERACTIVITY DISORDER (ADHD), PREDOMINANTLY INATTENTIVE TYPE: Primary | ICD-10-CM

## 2021-12-14 PROCEDURE — 99213 OFFICE O/P EST LOW 20 MIN: CPT | Performed by: FAMILY MEDICINE

## 2021-12-14 RX ORDER — DEXTROAMPHETAMINE SACCHARATE, AMPHETAMINE ASPARTATE MONOHYDRATE, DEXTROAMPHETAMINE SULFATE AND AMPHETAMINE SULFATE 3.75; 3.75; 3.75; 3.75 MG/1; MG/1; MG/1; MG/1
15 CAPSULE, EXTENDED RELEASE ORAL DAILY
Qty: 30 CAPSULE | Refills: 0 | Status: SHIPPED | OUTPATIENT
Start: 2022-01-14 | End: 2022-02-13

## 2021-12-14 RX ORDER — DEXTROAMPHETAMINE SACCHARATE, AMPHETAMINE ASPARTATE MONOHYDRATE, DEXTROAMPHETAMINE SULFATE AND AMPHETAMINE SULFATE 3.75; 3.75; 3.75; 3.75 MG/1; MG/1; MG/1; MG/1
15 CAPSULE, EXTENDED RELEASE ORAL DAILY
Qty: 30 CAPSULE | Refills: 0 | Status: SHIPPED | OUTPATIENT
Start: 2022-02-14 | End: 2022-03-16

## 2021-12-14 RX ORDER — DEXTROAMPHETAMINE SACCHARATE, AMPHETAMINE ASPARTATE MONOHYDRATE, DEXTROAMPHETAMINE SULFATE AND AMPHETAMINE SULFATE 5; 5; 5; 5 MG/1; MG/1; MG/1; MG/1
CAPSULE, EXTENDED RELEASE ORAL
Qty: 30 CAPSULE | Refills: 0 | Status: CANCELLED | OUTPATIENT
Start: 2021-12-14

## 2021-12-14 RX ORDER — DEXTROAMPHETAMINE SACCHARATE, AMPHETAMINE ASPARTATE MONOHYDRATE, DEXTROAMPHETAMINE SULFATE AND AMPHETAMINE SULFATE 3.75; 3.75; 3.75; 3.75 MG/1; MG/1; MG/1; MG/1
15 CAPSULE, EXTENDED RELEASE ORAL DAILY
Qty: 30 CAPSULE | Refills: 0 | Status: SHIPPED | OUTPATIENT
Start: 2021-12-14 | End: 2022-01-13

## 2021-12-14 ASSESSMENT — MIFFLIN-ST. JEOR: SCORE: 1299.73

## 2021-12-14 NOTE — TELEPHONE ENCOUNTER
[facility] Prior Authorization Team Request    Medication: D-Amphetamine ER 15mg    Pt ID #: [id number]    Filling Pharmacy: [facility]  Phone Number: [phone]

## 2021-12-14 NOTE — PROGRESS NOTES
Assessment & Plan     1. Attention deficit hyperactivity disorder (ADHD), predominantly inattentive type  2. Controlled substance agreement signed - 12/14/21  - amphetamine-dextroamphetamine (ADDERALL XR) 15 MG 24 hr capsule; Take 1 capsule (15 mg) by mouth daily  Dispense: 30 capsule; Refill: 0  - amphetamine-dextroamphetamine (ADDERALL XR) 15 MG 24 hr capsule; Take 1 capsule (15 mg) by mouth daily  Dispense: 30 capsule; Refill: 0  - amphetamine-dextroamphetamine (ADDERALL XR) 15 MG 24 hr capsule; Take 1 capsule (15 mg) by mouth daily  Dispense: 30 capsule; Refill: 0    We will refill her Adderall for 3 months at a time, 3 monthly prescriptions.  Decrease dose from 20 mg to 50 mg daily.  If she would like to go back up to 20 mg, we can make that change.  Just went to the bathroom. Will discuss UDS at next appointment.     HM: Declines flu or Covid vaccine today.    Return in about 6 months (around 6/14/2022) for Physical Exam with pap + med check .    Ju Padilla, [facility]    Donnie Prieto is a 26 year old who presents for the following health issues    Chief Complaints and History of Present Illnesses   Patient presents with     Recheck Medication         HPI     Ida presents today for a med check and to renew the controlled substance agreement.  Overall, things are going well with her Adderall extended release.  She notes that they do change the generic version from pill to fill and sometimes she feels like her heart is racing after she takes the medication.  She would like to try decreasing the dose a little bit.  No other concerns.    Her Pap smear showed L CHRISTINE on October 13, 2020.  Previous Pap also L CHRISTINE October 2019.  Colposcopy was performed July 22, 2021.  Cervix visibly normal during colposcopic procedure, ECC biopsy was normal.  Plan was for follow-up Pap smear in 1 year from colposcopy, July 22, 2022.    Review of Systems   See HPI above.      Objective   "  /73   Pulse 79   Ht 1.6 m (5' 3\")   Wt 58.1 kg (128 lb)   LMP  (LMP Unknown)   BMI 22.67 kg/m    Body mass index is 22.67 kg/m .  Physical Exam   GENERAL: Ida is a pleasant, well-appearing female, no acute distress.  HEART: Regular rate and rhythm, no murmurs.  LUNGS: Clear to auscultation bilaterally, unlabored.        "

## 2021-12-17 NOTE — TELEPHONE ENCOUNTER
Prior Authorization Approval    Authorization Effective Date: 12/17/2021  Authorization Expiration Date: 12/31/2099  Medication: amphetamine-dextroamphetamine (ADDERALL XR) 15 MG 24 hr capsule  Approved Dose/Quantity:    Reference #:     Insurance Company: [employer] - Phone [phone] Fax [phone]  Expected CoPay:       CoPay Card Available:      Foundation Assistance Needed:    Which Pharmacy is filling the prescription (Not needed for infusion/clinic administered): [id number] - [place], [place] - [address] [place] [address] E AT [place] [address] & [facility]  Pharmacy Notified: Yes  Patient Notified: Yes

## 2022-01-28 ENCOUNTER — MYC REFILL (OUTPATIENT)
Dept: FAMILY MEDICINE | Facility: CLINIC | Age: 27
End: 2022-01-28
Payer: COMMERCIAL

## 2022-01-28 DIAGNOSIS — F90.0 ATTENTION DEFICIT HYPERACTIVITY DISORDER (ADHD), PREDOMINANTLY INATTENTIVE TYPE: ICD-10-CM

## 2022-01-28 DIAGNOSIS — Z79.899 CONTROLLED SUBSTANCE AGREEMENT SIGNED: ICD-10-CM

## 2022-01-30 NOTE — TELEPHONE ENCOUNTER
Routing refill request to provider for review/approval because:  Controlled substance request    Last Written Prescription Date:  12/14/21  Last Fill Quantity: 30,  # refills: 0   Last office visit provider:  12/14/21     Requested Prescriptions   Pending Prescriptions Disp Refills     amphetamine-dextroamphetamine (ADDERALL XR) 15 MG 24 hr capsule 30 capsule 0     Sig: Take 1 capsule (15 mg) by mouth daily       There is no refill protocol information for this order          Jamin Sawyer RN 01/30/22 1:03 PM

## 2022-01-31 RX ORDER — DEXTROAMPHETAMINE SACCHARATE, AMPHETAMINE ASPARTATE MONOHYDRATE, DEXTROAMPHETAMINE SULFATE AND AMPHETAMINE SULFATE 3.75; 3.75; 3.75; 3.75 MG/1; MG/1; MG/1; MG/1
15 CAPSULE, EXTENDED RELEASE ORAL DAILY
Qty: 30 CAPSULE | Refills: 0 | OUTPATIENT
Start: 2022-01-31

## 2022-01-31 NOTE — TELEPHONE ENCOUNTER
3 months of prescriptions sent in during appointment on 12/14/21.  She should not need a refill at this time.  Patient updated by [employer] message.  Ju Padilla, DO

## 2022-02-09 ENCOUNTER — MYC REFILL (OUTPATIENT)
Dept: FAMILY MEDICINE | Facility: CLINIC | Age: 27
End: 2022-02-09
Payer: COMMERCIAL

## 2022-02-09 DIAGNOSIS — Z79.899 CONTROLLED SUBSTANCE AGREEMENT SIGNED: ICD-10-CM

## 2022-02-09 DIAGNOSIS — F90.0 ATTENTION DEFICIT HYPERACTIVITY DISORDER (ADHD), PREDOMINANTLY INATTENTIVE TYPE: ICD-10-CM

## 2022-02-13 RX ORDER — DEXTROAMPHETAMINE SACCHARATE, AMPHETAMINE ASPARTATE MONOHYDRATE, DEXTROAMPHETAMINE SULFATE AND AMPHETAMINE SULFATE 3.75; 3.75; 3.75; 3.75 MG/1; MG/1; MG/1; MG/1
15 CAPSULE, EXTENDED RELEASE ORAL DAILY
Qty: 30 CAPSULE | Refills: 0 | OUTPATIENT
Start: 2022-02-13

## 2022-02-13 NOTE — TELEPHONE ENCOUNTER
Refused request as duplicate. Prescription on file with start date of 2/14/2022.   Farrah Blount RN   02/13/22 7:29 AM  [facility] Nurse Advisor

## 2022-05-08 ENCOUNTER — HEALTH MAINTENANCE LETTER (OUTPATIENT)
Age: 27
End: 2022-05-08

## 2022-05-13 ENCOUNTER — MYC MEDICAL ADVICE (OUTPATIENT)
Dept: FAMILY MEDICINE | Facility: CLINIC | Age: 27
End: 2022-05-13
Payer: COMMERCIAL

## 2022-05-13 DIAGNOSIS — F90.0 ATTENTION DEFICIT HYPERACTIVITY DISORDER (ADHD), PREDOMINANTLY INATTENTIVE TYPE: ICD-10-CM

## 2022-05-13 RX ORDER — DEXTROAMPHETAMINE SACCHARATE, AMPHETAMINE ASPARTATE MONOHYDRATE, DEXTROAMPHETAMINE SULFATE AND AMPHETAMINE SULFATE 5; 5; 5; 5 MG/1; MG/1; MG/1; MG/1
CAPSULE, EXTENDED RELEASE ORAL
Qty: 30 CAPSULE | Refills: 0 | Status: SHIPPED | OUTPATIENT
Start: 2022-05-13

## 2022-05-13 NOTE — TELEPHONE ENCOUNTER
Can Ida come in at 11:20 for 11:40 appointment with me for wet prep?  Will need to schedule a separate appointment for physical + med check. Last note states: Return in about 6 months (around 6/14/2022) for Physical Exam with pap + med check .    Ju Padilla, DO

## 2022-05-13 NOTE — TELEPHONE ENCOUNTER
Informed patient of message below. Patient states understanding.    She will call back to schedule physical

## 2022-05-13 NOTE — TELEPHONE ENCOUNTER
I do not have time today to add on.  She can go to walk in care or urgent care, or trial topical monistat.   I can send in the adderall prescription for her.     1. Attention deficit hyperactivity disorder (ADHD), predominantly inattentive type  - amphetamine-dextroamphetamine (ADDERALL XR) 20 MG 24 hr capsule; TAKE 1 CAPSULE BY MOUTH EVERY DAY  Dispense: 30 capsule; Refill: 0     [place]  reviewed, is due for refill.  CSA up to date.   Due for med check before next fill.     Patient should schedule a physical + med check at convenience.    Ju Padilla, DO

## 2022-05-16 ENCOUNTER — TELEPHONE (OUTPATIENT)
Dept: FAMILY MEDICINE | Facility: CLINIC | Age: 27
End: 2022-05-16
Payer: COMMERCIAL

## 2022-05-20 NOTE — TELEPHONE ENCOUNTER
Prior Authorization Approval    Authorization Effective Date: 5/20/2022  Authorization Expiration Date: 12/31/2099  Medication: amphetamine-dextroamphetamine (ADDERALL XR) 20 MG 24 hr capsule  Approved Dose/Quantity:    Reference #:     Insurance Company: [employer] - Phone [phone] Fax [phone]  Expected CoPay:       CoPay Card Available:      Foundation Assistance Needed:    Which Pharmacy is filling the prescription (Not needed for infusion/clinic administered): [id number] - [place], [place] - [address] [place] [address] E AT [place] [address] & [facility]  Pharmacy Notified: Yes  Patient Notified: Yes

## 2023-01-14 ENCOUNTER — HEALTH MAINTENANCE LETTER (OUTPATIENT)
Age: 28
End: 2023-01-14

## 2023-06-02 ENCOUNTER — HEALTH MAINTENANCE LETTER (OUTPATIENT)
Age: 28
End: 2023-06-02

## 2024-06-30 ENCOUNTER — HEALTH MAINTENANCE LETTER (OUTPATIENT)
Age: 29
End: 2024-06-30